# Patient Record
Sex: FEMALE | Race: WHITE | Employment: FULL TIME | URBAN - METROPOLITAN AREA
[De-identification: names, ages, dates, MRNs, and addresses within clinical notes are randomized per-mention and may not be internally consistent; named-entity substitution may affect disease eponyms.]

---

## 2024-06-03 ENCOUNTER — HOSPITAL ENCOUNTER (EMERGENCY)
Age: 54
Discharge: HOME | End: 2024-06-03

## 2024-06-03 VITALS
OXYGEN SATURATION: 98 % | SYSTOLIC BLOOD PRESSURE: 152 MMHG | RESPIRATION RATE: 16 BRPM | HEIGHT: 62 IN | TEMPERATURE: 97.3 F | WEIGHT: 130 LBS | BODY MASS INDEX: 23.92 KG/M2 | HEART RATE: 74 BPM | DIASTOLIC BLOOD PRESSURE: 91 MMHG

## 2024-06-03 DIAGNOSIS — U07.1 COVID-19: Primary | ICD-10-CM

## 2024-06-03 LAB
INFLUENZA A BY PCR: NEGATIVE
INFLUENZA B BY PCR: NEGATIVE
SARS-COV-2 RDRP RESP QL NAA+PROBE: DETECTED

## 2024-06-03 PROCEDURE — 87635 SARS-COV-2 COVID-19 AMP PRB: CPT

## 2024-06-03 PROCEDURE — 99283 EMERGENCY DEPT VISIT LOW MDM: CPT

## 2024-06-03 PROCEDURE — 87502 INFLUENZA DNA AMP PROBE: CPT

## 2024-06-03 RX ORDER — BENZONATATE 100 MG/1
100 CAPSULE ORAL 3 TIMES DAILY PRN
Qty: 30 CAPSULE | Refills: 0 | Status: SHIPPED | OUTPATIENT
Start: 2024-06-03 | End: 2024-06-13

## 2024-06-03 RX ORDER — DEXTROMETHORPHAN HYDROBROMIDE AND PROMETHAZINE HYDROCHLORIDE 15; 6.25 MG/5ML; MG/5ML
2.5 SYRUP ORAL 4 TIMES DAILY PRN
Qty: 70 ML | Refills: 0 | Status: SHIPPED | OUTPATIENT
Start: 2024-06-03 | End: 2024-06-10

## 2024-06-03 ASSESSMENT — ENCOUNTER SYMPTOMS
VOMITING: 0
SHORTNESS OF BREATH: 0
COUGH: 1
DIARRHEA: 0
SORE THROAT: 0
NAUSEA: 0
ABDOMINAL PAIN: 0

## 2024-06-03 ASSESSMENT — PAIN - FUNCTIONAL ASSESSMENT: PAIN_FUNCTIONAL_ASSESSMENT: NONE - DENIES PAIN

## 2024-06-03 NOTE — ED PROVIDER NOTES
[facility] ED  eMERGENCYdEPARTMENT eNCOUnter        Pt Name: Evita Hamilton  MRN: [id number]  Birthdate 1970of evaluation: 6/3/[birth year]24  Provider:Susi Lr PA-C  10:48 AM EDT    CHIEF COMPLAINT       Chief Complaint   Patient presents with    Illness     Cough, worried about pneumonia.           HISTORY OF PRESENT ILLNESS  (Location/Symptom, Timing/Onset, Context/Setting, Quality, Duration, Modifying Factors, Severity.)   Evita Hamilton is a 53 y.o. female who presents to the emergency department for evaluation of cough and concern for pneumonia.  Patient reports that she started coughing yesterday and feels a \"hardness\" in her chest when she coughs.  Patient reports that she has had bouts of pneumonia in the past and she wanted to be seen as soon as possible to ensure it did not develop into a severe pneumonia.  Denies any history of asthma.  Denies any fever, ear pain, sore throat, chest pain, shortness of breath, abdominal pain, nausea, vomiting, diarrhea.    HPI    Nursing Notes were reviewed and I agree.    REVIEW OF SYSTEMS    (2-9 systems for level 4, 10 or more for level 5)     Review of Systems   Constitutional:  Negative for fever.   HENT:  Negative for congestion and sore throat.    Respiratory:  Positive for cough. Negative for shortness of breath.    Cardiovascular:  Negative for chest pain.   Gastrointestinal:  Negative for abdominal pain, diarrhea, nausea and vomiting.   All other systems reviewed and are negative.       as noted above the remainder of the review of systems was reviewed and negative.       PAST MEDICAL HISTORY   No past medical history on file.      SURGICAL HISTORY     No past surgical history on file.      CURRENT MEDICATIONS       Previous Medications    No medications on file       ALLERGIES     Patient has no known allergies.    HISTORY     No family history on file.       SOCIAL HISTORY       Social History     Socioeconomic History    Marital status:

## 2024-06-03 NOTE — DISCHARGE INSTRUCTIONS
Use Tylenol and/or Motrin as needed for any fevers or pain.  Make sure you get plenty of rest and drink plenty of fluids until symptoms resolve.  You can use over-the-counter medications like NyQuil or cough drops as needed for symptoms.  You can also use Tessalon Perles and meclizine DM that I prescribed as needed for cough if you can afford it.  Schedule follow-up with your PCP as discussed.  Return to the emergency department for any new or worsening symptoms.